# Patient Record
Sex: FEMALE | Race: ASIAN | NOT HISPANIC OR LATINO | Employment: UNEMPLOYED | URBAN - METROPOLITAN AREA
[De-identification: names, ages, dates, MRNs, and addresses within clinical notes are randomized per-mention and may not be internally consistent; named-entity substitution may affect disease eponyms.]

---
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
## 2021-05-31 VITALS — BODY MASS INDEX: 22.94 KG/M2 | WEIGHT: 121.5 LBS | HEIGHT: 61 IN

## 2021-06-01 VITALS — WEIGHT: 122 LBS | BODY MASS INDEX: 23.95 KG/M2 | HEIGHT: 60 IN

## 2021-06-01 NOTE — TELEPHONE ENCOUNTER
New Appointment Needed  What is the reason for the visit:    Physical   Provider Preference: Any available didn't receive information whether female or male provider. Please call patients mother with [facility]  to verify and whether or not patient can be seen around 9-10am on 9/27 when her other children will be seen.   How soon do you need to be seen?: Next Friday 9/27.  Waitlist offered?: No  Okay to leave a detailed message:  Yes

## 2021-06-14 NOTE — PROGRESS NOTES
[facility] Well Child Check/Sports Physical    ASSESSMENT & PLAN  Alonzo Lambert is a 17  y.o. 1  m.o. who has normal growth and normal development.  Pt was brought in by other sister and present with a professional  .       1. Sports Physical:   All clear for school sports/activities.  No restrictions.  Normal female exam. No health concerns.  School sports form filled out and pt requested to be sent to pt's home.       2.  Immunization   Ordered:    -Dtap   -Pneumococcal MCV4 vaccine  3. Acne  Few superficial acne on forehead.  Pt is concerned and would like treatment.  F/u in 3 months if not better or acne worsens.  Recommend OTC Cereve facial wash and moisturizer twice a day.     Ordered:   - Benzolyl peroxide 5% topical lotion.  BID.     3. Seasonal Allergy  Complaints of runny and itchy nose.  F/u if sxs not resolving and will increase dose or change to different med.  Pt prefer oral meds instead of nasal spray.    Ordered:   - Loratadine (CLARITIN) 10 MG tablet. One tablet daily as needed.     4. Other Health Maintenance  History of low vitamin D levels.  No recent labs.  Vit D med will be ordered and F/u in 3 months if today's levels are low.    Ordered:     -vit D lab  Return to clinic in 1 year for a Well Child Check or sooner as needed    IMMUNIZATIONS/LABS  Immunizations were reviewed and orders were placed as appropriate.    -Dtap   -Pneumococcal vaccine    REFERRALS  Dental:  The patient has already established care with a dentist.  Other:  No referrals were made at this time.    ANTICIPATORY GUIDANCE  I have reviewed age appropriate anticipatory guidance.    HEALTH HISTORY  Do you have any concerns that you'd like to discuss today?: No concerns       Roomed by: Riley Christianson CMA     services provided by: Agency  [facility]   /Agency Name [facility]    Location of  Services: In person        Do you have any significant health concerns in your family  history?: No  Family History   Problem Relation Age of Onset     No Medical Problems Mother      Since your last visit, have there been any major changes in your family, such as a move, job change, separation, divorce, or death in the family?: Yes: dad got a new job.    Home  Who lives in your home?:  Sister, mom, dad, 3 younger brothers  Social History     Social History Narrative     Do you have any trouble with sleep?:  No    Education  What school does your child attend?:  [employer]   What grade is your child in?:  9th  How does the patient perform in school (grades, behavior, attention, homework?: doing well     Eating  Does patient eat regular meals including fruits and vegetables?:  yes, sometimes  What is the patient drinking (cow's milk, water, soda, juice, sports drinks, energy drinks, etc)?: cow's milk- 2%, water, soda and juice  Does patient have concerns about body or appearance?:  No    Activities  Does the patient have friends?:  yes  Does the patient get at least one hour of physical activity per day?:  yes  Does the patient have less than 2 hours of screen time per day (aside from homework)?:  No:2-3 hours  What does your child do for exercise?:  Run, push ups, sit-ups, volleyball  Does the patient have interest/participate in community activities/volunteers/school sports?:  Yes-Student [employer]    MENTAL HEALTH SCREENING  PHQ-2 Total Score: 0 (11/20/2017  4:22 PM)  No Data Recorded    VISION/HEARING  Vision: Completed. See Results  Hearing:  Completed. See Results     Hearing Screening    Method: Audiometry    125Hz 250Hz 500Hz 1000Hz 2000Hz 3000Hz 4000Hz 6000Hz 8000Hz   Right ear:   20 20 20  20     Left ear:   20 20 20  20        Visual Acuity Screening    Right eye Left eye Both eyes   Without correction:      With correction: 20/20 20/25 20/16   Comments: Lens plus=PASSED    TB Risk Assessment:  The patient and/or parent/guardian answer positive to:  parents born outside of the [place]  Dental  Is  "your child being seen by a dentist?  Yes  Flouride Varnish Application Screening  Is child seen by dentist?     Yes    Patient Active Problem List   Diagnosis     Visual Impairment In Both Eyes     Drugs  Does the patient use tobacco/alcohol/drugs?:  no    Safety  Does the patient have any safety concerns (peer or home)?:  no  Does the patient use safety belts, helmets and other safety equipment?:  yes    Sex  Is the patient sexually active?:  no    MEASUREMENTS  Height:  5' 0.5\" (1.537 m)  Weight: 121 lb 8 oz (55.1 kg)  BMI: Body mass index is 23.34 kg/(m^2).  Blood Pressure: (!) 86/60  Blood pressure percentiles are 1 % systolic and 33 % diastolic based on NHBPEP's 4th Report. Blood pressure percentile targets: 90: 122/79, 95: 126/83, 99 + 5 mmH/95.    PHYSICAL EXAM  Vitals:    17 1618   BP: (!) 86/60   Pulse: 92   Resp: 16   Temp: 98.7  F (37.1  C)   TempSrc: Oral   Weight: 121 lb 8 oz (55.1 kg)   Height: 5' 0.5\" (1.537 m)     Body mass index is 23.34 kg/(m^2).    General Appearance: Alert, cooperative, no distress, appears stated age  Head: Normocephalic, without obvious abnormality, atraumatic  Eyes: PERRL, conjunctiva/corneas clear, EOM's intact. Pt wears glasses.   Ears: Normal TM's and external ear canals, both ears.  Throat: Lips, mucosa, and tongue normal; teeth and gums normal  Neck: Supple, symmetrical, trachea midline, no adenopathy;  thyroid: not enlarged, symmetric, no tenderness/mass/nodules.  Back: Symmetric, no curvature, ROM normal, no CVA tenderness  Lungs: Clear to auscultation bilaterally, respirations unlabored  Breasts: No breast masses, tenderness, asymmetry, or nipple discharge. Shreyas stage 3.   Heart: Regular rate and rhythm, S1 and S2 normal, no murmur, rub, or gallop,   Abdomen: Soft, non-tender, no masses, no organomegaly  Pelvic: Declined physical exam.   Extremities: Extremities normal, atraumatic, no cyanosis or edema  Skin: Skin color, texture, turgor normal, no rashes " or lesions.  Few acne on forehead.   Lymph nodes: Cervical and axillary nodes normal  Neurologic: Normal patellar DTR's, normal gait    Dalia Graham PA-C

## 2021-06-19 NOTE — PROGRESS NOTES
[facility] Well Child Check    ASSESSMENT & PLAN  Alonzo Lambert is a 17  y.o. 10  m.o. who has normal growth and normal development.    Diagnoses and all orders for this visit:    Encounter for routine child health examination w/o abnormal findings  -     Hemoglobin        Return to clinic in 1 year for a Well Child Check or sooner as needed    IMMUNIZATIONS/LABS  No immunizations due today.  Hemoglobin drawn today.    REFERRALS  Dental:  The patient has already established care with a dentist.  Other:  No additional referrals were made at this time.    ANTICIPATORY GUIDANCE  Social:  Extracurricular Activities  Parenting:  [employer]/Dependence and Homework  Nutrition:  Dieting and Body Image  Health:  Activity (>45 min/day), Sleep and Dental Care    HEALTH HISTORY  Do you have any concerns that you'd like to discuss today?: No concerns       Roomed by: Helen Fletcher     Accompanied by Mother    Refills needed? No    Do you have any forms that need to be filled out? No     services provided by: Agency     /Agency Name [facility]    Location of  Services: In person        Do you have any significant health concerns in your family history?: No  Family History   Problem Relation Age of Onset     No Medical Problems Mother      Since your last visit, have there been any major changes in your family, such as a move, job change, separation, divorce, or death in the family?: No  Has a lack of transportation kept you from medical appointments?: No    Do you have any concerns about losing your housing?: No  Is your housing safe and comfortable?: Yes  Do you have any trouble with sleep?:  No    Education  What school do you child attend?:  [employer]  What grade are you in?:  10th  How do you perform in school (grades, behavior, attention, homework?:  Gets good grades.  No behavior concerns.  Has multiple friends.    Eating  Do you eat regular meals  "including fruits and vegetables?:  yes  What are you drinking (cow's milk, water, soda, juice, sports drinks, energy drinks, etc)?: cow's milk- 1%, water and juice  Have you been worried that you don't have enough food?: No  Do you have concerns about your body or appearance?:  No    Activities  Do you have friends?:  yes  Do you get at least one hour of physical activity per day?:  yes  How many hours a day are you in front of a screen other than for schoolwork (computer, TV, phone)?:  3  What do you do for exercise?:  Plays volleyball.  Rides bicycle.      MENTAL HEALTH SCREENING  PHQ-2 Total Score: 0 (8/14/2018  3:03 PM)  PHQ-9 Total Score: 0 (8/14/2018  3:03 PM)    VISION/HEARING  Vision: Completed. See Results  Hearing:  Completed. See Results     Hearing Screening    Method: Audiometry    125Hz 250Hz 500Hz 1000Hz 2000Hz 3000Hz 4000Hz 6000Hz 8000Hz   Right ear:   30 20 20  20 20    Left ear:   20 20 20  20 20       Visual Acuity Screening    Right eye Left eye Both eyes   Without correction: 20/100 20/160 20/80   With correction:      Comments: Pt forgot to bring her glasses   Plus Lens: Pass: blurring of vision with +2.50 lens glasses        TB Risk Assessment:  The patient and/or parent/guardian answer positive to:  parents born outside of the [place]    Dyslipidemia Risk Screening  Have either of your parents or any of your grandparents had a stroke or heart attack before age 55?: No  Any parents with high cholesterol or currently taking medications to treat?: No     Dental  When was the last time you saw the dentist?: 1-3 months ago      Patient Active Problem List   Diagnosis     Visual Impairment In Both Eyes       Drugs  Does the patient use tobacco/alcohol/drugs?:  no    Safety  Does the patient have any safety concerns (peer or home)?:  no  Does the patient use safety belts, helmets and other safety equipment?:  yes    Sex  Have you ever had sex?:  No    MEASUREMENTS  Height:  5' 0.04\" (1.525 m)  Weight: " 122 lb (55.3 kg)  BMI: Body mass index is 23.8 kg/(m^2).  Blood Pressure: 98/72  Blood pressure percentiles are 16 % systolic and 79 % diastolic based on the 2017 AAP Clinical Practice Guideline. Blood pressure percentile targets: 90: 121/76, 95: 126/80, 95 + 12 mmH/92.    PHYSICAL EXAM  Physical Exam   Constitutional: She is oriented to person, place, and time. She appears well-developed and well-nourished. No distress.   HENT:   Right Ear: External ear normal.   Left Ear: External ear normal.   Nose: Nose normal.   Mouth/Throat: Oropharynx is clear and moist. No oropharyngeal exudate.   Eyes: Conjunctivae and EOM are normal. Pupils are equal, round, and reactive to light.   Neck: Normal range of motion. Neck supple. No JVD present. No thyromegaly present.   Cardiovascular: Normal rate and regular rhythm.    No murmur heard.  Pulmonary/Chest: Effort normal and breath sounds normal. No respiratory distress.   Abdominal: Soft. Bowel sounds are normal. She exhibits no mass. There is no tenderness.   Musculoskeletal: Normal range of motion. She exhibits no edema or tenderness.   Lymphadenopathy:     She has no cervical adenopathy.   Neurological: She is alert and oriented to person, place, and time. She has normal reflexes.   Skin: Skin is warm and dry. No rash noted.   Psychiatric: She has a normal mood and affect.

## 2021-12-15 ENCOUNTER — HOSPITAL ENCOUNTER (EMERGENCY)
Facility: HOSPITAL | Age: 21
Discharge: HOME | End: 2021-12-15
Admitting: PHYSICIAN ASSISTANT
Payer: COMMERCIAL

## 2021-12-15 VITALS
WEIGHT: 132 LBS | SYSTOLIC BLOOD PRESSURE: 111 MMHG | BODY MASS INDEX: 25.75 KG/M2 | TEMPERATURE: 98.7 F | HEART RATE: 91 BPM | OXYGEN SATURATION: 98 % | DIASTOLIC BLOOD PRESSURE: 71 MMHG | RESPIRATION RATE: 16 BRPM

## 2021-12-15 DIAGNOSIS — J10.1 INFLUENZA A: ICD-10-CM

## 2021-12-15 LAB
DEPRECATED S PYO AG THROAT QL EIA: NEGATIVE
FLUAV RNA SPEC QL NAA+PROBE: POSITIVE
FLUBV RNA RESP QL NAA+PROBE: NEGATIVE
GROUP A STREP BY PCR: DETECTED
SARS-COV-2 RNA RESP QL NAA+PROBE: NEGATIVE

## 2021-12-15 PROCEDURE — 87651 STREP A DNA AMP PROBE: CPT | Performed by: EMERGENCY MEDICINE

## 2021-12-15 PROCEDURE — 250N000013 HC RX MED GY IP 250 OP 250 PS 637: Performed by: EMERGENCY MEDICINE

## 2021-12-15 PROCEDURE — 87636 SARSCOV2 & INF A&B AMP PRB: CPT | Performed by: EMERGENCY MEDICINE

## 2021-12-15 PROCEDURE — 99283 EMERGENCY DEPT VISIT LOW MDM: CPT

## 2021-12-15 PROCEDURE — C9803 HOPD COVID-19 SPEC COLLECT: HCPCS

## 2021-12-15 RX ORDER — OSELTAMIVIR PHOSPHATE 75 MG/1
75 CAPSULE ORAL 2 TIMES DAILY
Qty: 10 CAPSULE | Refills: 0 | Status: SHIPPED | OUTPATIENT
Start: 2021-12-15 | End: 2021-12-20

## 2021-12-15 RX ORDER — ACETAMINOPHEN 325 MG/1
650 TABLET ORAL ONCE
Status: DISCONTINUED | OUTPATIENT
Start: 2021-12-15 | End: 2021-12-15 | Stop reason: HOSPADM

## 2021-12-15 RX ADMIN — BENZOCAINE AND MENTHOL 1 LOZENGE: 15; 3.6 LOZENGE ORAL at 12:15

## 2021-12-15 ASSESSMENT — ENCOUNTER SYMPTOMS
ABDOMINAL PAIN: 0
SORE THROAT: 1
DIARRHEA: 0
VOMITING: 0
SHORTNESS OF BREATH: 0
DIZZINESS: 0
DIAPHORESIS: 0
TROUBLE SWALLOWING: 0
CHILLS: 1
NECK PAIN: 0
VOICE CHANGE: 0
FEVER: 0
NECK STIFFNESS: 0
COUGH: 1
HEADACHES: 0

## 2021-12-15 NOTE — ED TRIAGE NOTES
Patient presents here with a sore throat and cough that has occurred over the past few days. She did have an exposure to a family member with covid 19. Patient is vaccinated.

## 2021-12-15 NOTE — ED NOTES
ED Triage Provider Note  [facility]  Encounter Date: Dec 15, 2021    History:  Chief Complaint   Patient presents with     Pharyngitis     Grace GALEANO So is a 21 year old female who presents to the ED with headache, chills, cough x 1 day. Positive covid exposure.     Review of Systems:  Positive for chills, cough, sore throat and headache    Exam:  /76   Pulse 96   Temp 98.7  F (37.1  C) (Oral)   Resp 16   Wt 59.9 kg (132 lb)   SpO2 98%   BMI 25.75 kg/m    General: No acute distress. Appears stated age.   Cardio: Regular rate,  Resp: Normal work of breathing  Neuro: Alert.      Medical Decision Making:  Patient arriving to the ED with problem as above. A medical screening exam was performed.  Oxygen saturation and work of breathing is good.  Orders initiated from Triage. The patient is appropriate to wait in triage.      Temo Murray MD on 12/15/2021 at 11:30 AM      Lab/Imaging Results:       Interventions:  Medications   acetaminophen (TYLENOL) tablet 650 mg (has no administration in time range)   benzocaine-menthol (CEPACOL) 15-3.6 MG lozenge 1 lozenge (has no administration in time range)            Temo Murray MD  12/15/21 2255

## 2021-12-15 NOTE — LETTER
December 17, 2021    [place] [address] [facility]                                                                   [address] [place] [address]          Your recent strep culture returned positive for strep.  I sent a prescription for antibiotics to the [facility] listed in your chart as your preferred pharmacy.    If you have any questions, please do not hesitate to call us at [phone]      Sincerely,          Laly Whipple PA-C

## 2021-12-15 NOTE — DISCHARGE INSTRUCTIONS
As we discussed, your influenza test is positive and this is the likely source of your symptoms.  Please rest, drink plenty of fluids, and use Tylenol or Motrin as needed for fever or body aches.  I will send you home with a prescription for Tamiflu which can help relieve your symptoms.  Please return to the ED if you develop any difficulty breathing, dizziness, near loss of consciousness, or any new or concerning symptoms.

## 2021-12-15 NOTE — Clinical Note
Grace Lambert was seen and treated in our emergency department on 12/15/2021.  She may return to work on 12/27/2021.  May not return to work until 12/27.      If you have any questions or concerns, please don't hesitate to call.      Dulce Kolb PA-C

## 2021-12-15 NOTE — ED PROVIDER NOTES
Emergency Department Encounter   NAME: Grace Lambert ; AGE: 21 year old female ; YOB: 2000 ; MRN: [id number] ; PCP: No Ref-Primary, Physician   ED PROVIDER: Dulce Kolb PA-C    Evaluation Date & Time:   No admission date for patient encounter.    CHIEF COMPLAINT:  Pharyngitis      Impression and Plan   MDM: Grace Lambert is a 21 year old female with no pertinent past medical history, who presents to the ED by self with father and brother, for evaluation of sore throat.  The patient presented to the emergency department for evaluation of viral URI symptoms that started yesterday including chills, headache, sore throat, and dry cough.  She did have a close exposure to COVID-19 as her brother who lives at home tested positive this week.  Here in the emergency department, she is afebrile and vitally stable.  Generally well-appearing and in no acute distress.  Nontoxic.  She does have erythema of her posterior oropharynx, although the remainder of her throat exam is completely unremarkable.  Rapid strep negative, and very low suspicion that culture returned positive.  Certainly no findings to suggest peritonsillar abscess or retropharyngeal abscess.  COVID-19 swab returned negative, however surprisingly influenza A returned positive.  Her younger brother also tested positive for influenza here in the emergency department, and this is the likely source of your symptoms.  We discussed pros and cons of Tamiflu, and patient would like to move forward with prescription.  Reviewed supportive measures for home, follow-up in clinic, and reasons to return to the emergency department.  She verbalized understanding is comfortable the plan.  Discharged home in good condition.  Offered professional Czech , however patient would prefer to speak English.    ED COURSE:  12:22 PM I met and introduced myself to the patient. I gathered initial history and performed my physical exam. We discussed plan for initial  workup.   1:21 PM We discussed plans for discharge including supportive cares, symptomatic treatment, outpatient follow up, and reasons to return to the emergency department.      At the conclusion of the encounter I discussed the results of all the tests and the disposition. The questions were answered. The patient or family acknowledged understanding and was agreeable with the care plan.    FINAL IMPRESSION:    ICD-10-CM    1. Influenza A  J10.1          MEDICATIONS GIVEN IN THE EMERGENCY DEPARTMENT:  Medications   acetaminophen (TYLENOL) tablet 650 mg (650 mg Oral Not Given 12/15/21 1220)   benzocaine-menthol (CEPACOL) 15-3.6 MG lozenge 1 lozenge (1 lozenge Buccal Given 12/15/21 1215)         NEW PRESCRIPTIONS STARTED AT TODAY'S ED VISIT:  Discharge Medication List as of 12/15/2021  2:14 PM      START taking these medications    Details   oseltamivir (TAMIFLU) 75 MG capsule Take 1 capsule (75 mg) by mouth 2 times daily for 5 days, Disp-10 capsule, R-0, Local Print               HPI   Patient information was obtained from: Patient    Use of Intrepreter: N/A     Ehpaw FROYLAN Lambert is a 21 year old female with no pertinent past medical history, who presents to the ED by self with father and brother, for evaluation of sore throat.     The patient woke up today with a mild headache, feeling chilled, and a dry cough.  She has had a sore throat since yesterday evening.  Her brother tested positive for COVID-19 this week and she has had a close exposure.  She is here in the emergency department with another younger brother who is presenting with similar symptoms.  She has no chronic health conditions and takes no daily medicines.  She has not taken her temperature at home.  Despite her sore throat, she has been able to eat and drink normally.  No difficulty breathing, chest pain, dizziness or near loss of consciousness.      REVIEW OF SYSTEMS:  Review of Systems   Constitutional: Positive for chills. Negative for diaphoresis and  fever.   HENT: Positive for sore throat. Negative for ear pain, trouble swallowing and voice change.    Respiratory: Positive for cough. Negative for shortness of breath.    Cardiovascular: Negative for chest pain.   Gastrointestinal: Negative for abdominal pain, diarrhea and vomiting.   Musculoskeletal: Negative for neck pain and neck stiffness.   Skin: Negative for rash.   Neurological: Negative for dizziness, syncope and headaches.   All other systems reviewed and are negative.        Medical History     No past medical history on file.    No past surgical history on file.    Family History   Problem Relation Age of Onset     No Known Problems Mother        Social History     Tobacco Use     Smoking status: Passive Smoke Exposure - Never Smoker     Smokeless tobacco: Never Used     Tobacco comment: mom-outside   Substance Use Topics     Alcohol use: Not on file     Drug use: Not on file       oseltamivir (TAMIFLU) 75 MG capsule  benzoyl peroxide 5 % topical lotion  loratadine (CLARITIN) 10 mg tablet          Physical Exam     First Vitals:  Patient Vitals for the past 24 hrs:   BP Temp Temp src Pulse Resp SpO2 Weight   12/15/21 1418 111/71 -- -- 91 -- 98 % --   12/15/21 1130 125/76 98.7  F (37.1  C) Oral 96 16 98 % 59.9 kg (132 lb)         PHYSICAL EXAM:   Physical Exam  Vitals and nursing note reviewed.   Constitutional:       General: She is not in acute distress.     Appearance: She is well-developed. She is not toxic-appearing or diaphoretic.   HENT:      Head: Normocephalic.      Mouth/Throat:      Mouth: Mucous membranes are moist.      Pharynx: Uvula midline.      Tonsils: No tonsillar abscesses.      Comments: Erythema to posterior oropharynx.  No edema or tonsillar adenopathy.  No exudates present.  No tender cervical or submandibular adenopathy.  No asymmetric soft palate swelling or uvular deviation.  Eyes:      Conjunctiva/sclera: Conjunctivae normal.      Pupils: Pupils are equal, round, and reactive  to light.   Cardiovascular:      Rate and Rhythm: Normal rate and regular rhythm.      Heart sounds: Normal heart sounds.   Pulmonary:      Effort: Pulmonary effort is normal.      Breath sounds: Normal breath sounds. No wheezing, rhonchi or rales.   Musculoskeletal:      Cervical back: Normal range of motion and neck supple.   Neurological:      Mental Status: She is alert.             Results     LAB:  All pertinent labs reviewed and interpreted  Labs Ordered and Resulted from Time of ED Arrival to Time of ED Departure   INFLUENZA A/B & SARS-COV2 PCR MULTIPLEX - Abnormal       Result Value    Influenza A PCR Positive (*)     Influenza B PCR Negative      SARS CoV2 PCR Negative     STREPTOCOCCUS A RAPID SCREEN W REFELX TO PCR - Normal    Group A Strep antigen Negative     GROUP A STREPTOCOCCUS PCR THROAT SWAB       RADIOLOGY:  No orders to display     ISanjana, am serving as a scribe to document services personally performed by Dulce Kolb PA-C, based on my observation and the provider's statements to me. IDulce PA-C attest that Sanjana Houser is acting in a scribe capacity, has observed my performance of the services and has documented them in accordance with my direction.       Dulce Kolb PA-C   Emergency Medicine   [facility] EMERGENCY DEPARTMENT       Dulce Kolb PA-C  12/15/21 7082

## 2021-12-17 RX ORDER — AMOXICILLIN 500 MG/1
500 CAPSULE ORAL 3 TIMES DAILY
Qty: 21 CAPSULE | Refills: 0 | Status: SHIPPED | OUTPATIENT
Start: 2021-12-17 | End: 2021-12-24

## 2022-08-15 ENCOUNTER — OFFICE VISIT (OUTPATIENT)
Dept: INTERNAL MEDICINE | Facility: CLINIC | Age: 22
End: 2022-08-15
Payer: COMMERCIAL

## 2022-08-15 VITALS
HEART RATE: 83 BPM | HEIGHT: 61 IN | BODY MASS INDEX: 26.06 KG/M2 | OXYGEN SATURATION: 98 % | SYSTOLIC BLOOD PRESSURE: 123 MMHG | DIASTOLIC BLOOD PRESSURE: 71 MMHG | WEIGHT: 138 LBS

## 2022-08-15 DIAGNOSIS — Z11.1 SCREENING EXAMINATION FOR PULMONARY TUBERCULOSIS: ICD-10-CM

## 2022-08-15 DIAGNOSIS — Z00.00 ANNUAL PHYSICAL EXAM: Primary | ICD-10-CM

## 2022-08-15 DIAGNOSIS — D64.9 ANEMIA, UNSPECIFIED TYPE: ICD-10-CM

## 2022-08-15 DIAGNOSIS — E55.9 VITAMIN D DEFICIENCY: ICD-10-CM

## 2022-08-15 LAB
BASOPHILS # BLD AUTO: 0.1 10E3/UL (ref 0–0.2)
BASOPHILS NFR BLD AUTO: 1 %
EOSINOPHIL # BLD AUTO: 0.1 10E3/UL (ref 0–0.7)
EOSINOPHIL NFR BLD AUTO: 1 %
ERYTHROCYTE [DISTWIDTH] IN BLOOD BY AUTOMATED COUNT: 17.7 % (ref 10–15)
HCT VFR BLD AUTO: 43.6 % (ref 35–47)
HGB BLD-MCNC: 13.7 G/DL (ref 11.7–15.7)
IMM GRANULOCYTES # BLD: 0 10E3/UL
IMM GRANULOCYTES NFR BLD: 0 %
IRON BINDING CAPACITY (ROCHE): 389 UG/DL (ref 240–430)
IRON SATN MFR SERPL: 23 % (ref 15–46)
IRON SERPL-MCNC: 89 UG/DL (ref 37–145)
LYMPHOCYTES # BLD AUTO: 2.2 10E3/UL (ref 0.8–5.3)
LYMPHOCYTES NFR BLD AUTO: 31 %
MCH RBC QN AUTO: 19.6 PG (ref 26.5–33)
MCHC RBC AUTO-ENTMCNC: 31.4 G/DL (ref 31.5–36.5)
MCV RBC AUTO: 63 FL (ref 78–100)
MONOCYTES # BLD AUTO: 0.5 10E3/UL (ref 0–1.3)
MONOCYTES NFR BLD AUTO: 8 %
NEUTROPHILS # BLD AUTO: 4.1 10E3/UL (ref 1.6–8.3)
NEUTROPHILS NFR BLD AUTO: 59 %
NRBC # BLD AUTO: 0 10E3/UL
NRBC BLD AUTO-RTO: 0 /100
PLATELET # BLD AUTO: 303 10E3/UL (ref 150–450)
RBC # BLD AUTO: 6.98 10E6/UL (ref 3.8–5.2)
WBC # BLD AUTO: 6.9 10E3/UL (ref 4–11)

## 2022-08-15 PROCEDURE — 82306 VITAMIN D 25 HYDROXY: CPT | Performed by: INTERNAL MEDICINE

## 2022-08-15 PROCEDURE — 85025 COMPLETE CBC W/AUTO DIFF WBC: CPT | Performed by: INTERNAL MEDICINE

## 2022-08-15 PROCEDURE — 86481 TB AG RESPONSE T-CELL SUSP: CPT | Performed by: INTERNAL MEDICINE

## 2022-08-15 PROCEDURE — 36415 COLL VENOUS BLD VENIPUNCTURE: CPT | Performed by: INTERNAL MEDICINE

## 2022-08-15 PROCEDURE — 99213 OFFICE O/P EST LOW 20 MIN: CPT | Mod: 25 | Performed by: INTERNAL MEDICINE

## 2022-08-15 PROCEDURE — 83550 IRON BINDING TEST: CPT | Performed by: INTERNAL MEDICINE

## 2022-08-15 PROCEDURE — 83540 ASSAY OF IRON: CPT | Performed by: INTERNAL MEDICINE

## 2022-08-15 PROCEDURE — 99385 PREV VISIT NEW AGE 18-39: CPT | Performed by: INTERNAL MEDICINE

## 2022-08-15 ASSESSMENT — ENCOUNTER SYMPTOMS
FEVER: 0
PALPITATIONS: 0
MYALGIAS: 0
COUGH: 0
HEMATURIA: 0
FREQUENCY: 0
JOINT SWELLING: 0
HEMATOCHEZIA: 0
NERVOUS/ANXIOUS: 0
BREAST MASS: 0
ARTHRALGIAS: 0
PARESTHESIAS: 0
WEAKNESS: 0
SORE THROAT: 0
EYE PAIN: 0
CONSTIPATION: 0
CHILLS: 0
HEADACHES: 0
DIARRHEA: 0
SHORTNESS OF BREATH: 0
NAUSEA: 0
DYSURIA: 0
ABDOMINAL PAIN: 0
DIZZINESS: 0
HEARTBURN: 0

## 2022-08-15 NOTE — PATIENT INSTRUCTIONS
Will check labs today and TB screen    2. Continue daily exercise.    3. Increase calcium in diet: eat more almonds and almond milk.

## 2022-08-16 LAB — DEPRECATED CALCIDIOL+CALCIFEROL SERPL-MC: 15 UG/L (ref 20–75)

## 2022-08-17 LAB
GAMMA INTERFERON BACKGROUND BLD IA-ACNC: 0.57 IU/ML
M TB IFN-G BLD-IMP: NEGATIVE
M TB IFN-G CD4+ BCKGRND COR BLD-ACNC: 9.43 IU/ML
MITOGEN IGNF BCKGRD COR BLD-ACNC: -0.09 IU/ML
MITOGEN IGNF BCKGRD COR BLD-ACNC: 0.03 IU/ML
QUANTIFERON MITOGEN: 10 IU/ML
QUANTIFERON NIL TUBE: 0.57 IU/ML
QUANTIFERON TB1 TUBE: 0.6 IU/ML
QUANTIFERON TB2 TUBE: 0.48

## 2022-08-20 ENCOUNTER — TELEPHONE (OUTPATIENT)
Dept: INTERNAL MEDICINE | Facility: CLINIC | Age: 22
End: 2022-08-20

## 2022-08-20 DIAGNOSIS — E55.9 VITAMIN D DEFICIENCY: Primary | ICD-10-CM

## 2022-08-20 RX ORDER — ERGOCALCIFEROL 1.25 MG/1
50000 CAPSULE, LIQUID FILLED ORAL WEEKLY
Qty: 12 CAPSULE | Refills: 0 | Status: SHIPPED | OUTPATIENT
Start: 2022-08-20

## 2022-10-01 ENCOUNTER — HEALTH MAINTENANCE LETTER (OUTPATIENT)
Age: 22
End: 2022-10-01

## 2022-11-11 ENCOUNTER — MYC MEDICAL ADVICE (OUTPATIENT)
Dept: INTERNAL MEDICINE | Facility: CLINIC | Age: 22
End: 2022-11-11

## 2023-10-21 ENCOUNTER — HEALTH MAINTENANCE LETTER (OUTPATIENT)
Age: 23
End: 2023-10-21

## 2024-12-08 ENCOUNTER — HEALTH MAINTENANCE LETTER (OUTPATIENT)
Age: 24
End: 2024-12-08